# Patient Record
Sex: MALE | Race: WHITE | NOT HISPANIC OR LATINO | Employment: OTHER | URBAN - METROPOLITAN AREA
[De-identification: names, ages, dates, MRNs, and addresses within clinical notes are randomized per-mention and may not be internally consistent; named-entity substitution may affect disease eponyms.]

---

## 2018-08-21 ENCOUNTER — TRANSFERRED RECORDS (OUTPATIENT)
Dept: HEALTH INFORMATION MANAGEMENT | Facility: CLINIC | Age: 78
End: 2018-08-21

## 2018-09-26 ENCOUNTER — TELEPHONE (OUTPATIENT)
Dept: AUDIOLOGY | Facility: CLINIC | Age: 78
End: 2018-09-26

## 2018-09-26 NOTE — TELEPHONE ENCOUNTER
Discussed with Marcelina the type of insurance Reji has. Informed them if the plan is a Medicaid or [place] plan they usually cover hearing aids. I recommended they check with their particular insurance to verify benefits. I informed her that if the plan does cover hearing aids at 100% Reji would need medical clearance for hearing aids. Marcelina reports they have a physician that frequents Reji's residence and they could get clearance from that physician. I informed Marcelina that as a courtesy I would have one of our specialists check on his insurance and I would call with the information that is discovered and then we could make plans from there. Marcelina agrees and will check with insurance and await my return call.     -Kelvin Powell CCC-A

## 2018-09-26 NOTE — TELEPHONE ENCOUNTER
Reason for Call:  Other call back    Detailed comments: Marcelina is POA for Reji. Patient had a hearing test done at [facility] and the facility didn't cover the hearing aids. Patient was advised to come to [place] as we would. Can this be confirmed? They have been looking everywhere for someone that is able to get these hearing aids for them. Also since they already had a hearing test done, would they be able to bring in the hearing test paperwork and just have a consult for the hearing aids? Please advise.     Phone Number Patient can be reached at: Home number on file [phone] (home)    Best Time: any     Can we leave a detailed message on this number? YES    Call taken on 9/26/2018 at 9:05 AM by Dimitris Verde

## 2018-10-25 NOTE — TELEPHONE ENCOUNTER
Patients daughter calling regarding medical nae needed for hearing aids. She is a little confused about what exactly she needs to get. Is it a form or a letter? Please call to clarify. Thank you

## 2018-10-25 NOTE — TELEPHONE ENCOUNTER
I informed Marcelina of the reasons for the medical clearance by a physician. Marcelina informs me that she would bring the medical clearance to Reji's appointment on the 5th of November.     -Kelvin CANOA

## 2018-11-01 ENCOUNTER — MEDICAL CORRESPONDENCE (OUTPATIENT)
Dept: HEALTH INFORMATION MANAGEMENT | Facility: CLINIC | Age: 78
End: 2018-11-01

## 2018-11-05 ENCOUNTER — TELEPHONE (OUTPATIENT)
Dept: AUDIOLOGY | Facility: CLINIC | Age: 78
End: 2018-11-05

## 2018-11-05 NOTE — TELEPHONE ENCOUNTER
Reason for Call:  Other call back    Detailed comments: Patients daughter is calling wanting her dad to be seen asap for him to get hearing aides fitted. He was canceled for today and there are only ent spots open on the schedule for tomorrow and she wants him seen tomorrow if possible. Please call back    Phone Number Patient can be reached at: Home number on file [phone] (home)    Best Time: any    Can we leave a detailed message on this number? YES    Call taken on 11/5/2018 at 9:12 AM by Melissa Choi

## 2018-11-05 NOTE — TELEPHONE ENCOUNTER
Spoke with patient's daughter and informed her that her father cannot be seen tomorrow without provider permission, and the provider is out today. Daughter states that she will need to reschedule for a few weeks out due to her work schedule. Dr. Cronin's next available appointment is on 11/28 in the [facility]. Patient and daughter scheduled for 11/28 at 3:00. They are aware this appointment is in [place] Provided daughter with the clinic address.     Alejandra Osborne, CCC-A  Licensed Audiologist #[id number]  11/5/2018

## 2019-01-02 ENCOUNTER — OFFICE VISIT (OUTPATIENT)
Dept: AUDIOLOGY | Facility: CLINIC | Age: 79
End: 2019-01-02
Payer: COMMERCIAL

## 2019-01-02 DIAGNOSIS — H90.3 SENSORINEURAL HEARING LOSS, BILATERAL: Primary | ICD-10-CM

## 2019-01-02 PROCEDURE — V5275 EAR IMPRESSION: HCPCS | Mod: RT | Performed by: AUDIOLOGIST

## 2019-01-02 PROCEDURE — 99207 ZZC NO CHARGE LOS: CPT | Performed by: AUDIOLOGIST

## 2019-01-02 PROCEDURE — 92591 HC HEARING AID EXAM BINAURAL: CPT | Performed by: AUDIOLOGIST

## 2019-01-02 NOTE — PROGRESS NOTES
AUDIOLOGY REPORT    SUBJECTIVE: Reji Marroquin is a 78 year old male was seen in the Audiology Clinic at  [facility] on 1/02/19 to discuss concerns with hearing and functional communication difficulties. The patient was accompanied by their wife and daughter. Reji has been seen previously on 8/21/2018 at [facility] in [place], and results revealed a mild-moderately severe sensorineural hearing loss bilaterally. Reji notes difficulty with communication in a variety of listening situations.    OBJECTIVE:  Patient is a hearing aid candidate. Patient would like to move forward with a hearing aid evaluation today. Therefore, the patient was presented with different options for amplification to help aid in communication. Discussed styles, levels of technology and monaural vs. binaural fitting.     The hearing aid(s) mutually chosen were:  Binaural: Phonak Audeo M30-R  COLOR: sandalwood  BATTERY SIZE: rechargeable  EARMOLD/TIPS: custom SlimTips  CANAL/ LENGTH: 1    Otoscopy revealed ears are clear of cerumen bilaterally. Bilateral ear impressions were taken without incident.    ASSESSMENT:   Bilateral sensorineural hearing loss     Reviewed purchase information and warranty information with patient. The 45 day trial period was explained to patient. The patient was given a copy of the [place] Department of Health consumer brochure on purchasing hearing instruments. Patient risk factors have been provided to the patient in writing prior to the sale of the hearing aid per FDA regulation. The risk factors are also available in the User Instructional Booklet to be presented on the day of the hearing aid fitting. Hearing aid(s) ordered. Hearing aid evaluation completed. Patient was encouraged to contact their insurance company to investigate any possible hearing aid benefit prior to hearing aid fitting. They are aware that they are ultimately responsible for full payment of  the hearing aid if no insurance coverage exists.       PLAN: Reji is scheduled to return in 2-3 weeks for a hearing aid fitting and programming. Purchase agreement will be completed on that date. Please contact this clinic with any questions or concerns.    Alejandra Osborne, CCC-A  Licensed Audiologist #[id number]  1/2/2019

## 2019-02-04 ENCOUNTER — TELEPHONE (OUTPATIENT)
Dept: AUDIOLOGY | Facility: CLINIC | Age: 79
End: 2019-02-04

## 2019-02-04 NOTE — TELEPHONE ENCOUNTER
Left message for Marcelina informing her of the price of hearing aids ($2760). Also discussed that today's fitting appointment will need to be rescheduled as the hearing aids were backordered by the  and will not arrive in time. Offered appointments on Thursday 2/7 and apologized for the inconvenience. Provided Marcelina will callback number to reschedule.     Alejandra Cooper, [facility]-A  Licensed Audiologist #[id number]  2/4/2019

## 2019-02-15 NOTE — PROGRESS NOTES
AUDIOLOGY REPORT    SUBJECTIVE: Reji Marroquin, a 78 year old male, was seen in the Audiology Clinic at [facility] today for a Binaural hearing aid fitting. Previous results have revealed a bilateral mild-moderately severe sensorineural hearing loss. Patient is accompanied to today's appointment by his wife and daughter.      OBJECTIVE:  Prior to fitting, a hearing aid check was performed to ensure device functionality. The hearing aid conformity evaluation was completed.The hearing aids were placed and they provided a good fit. Real-ear-probe-microphone measurements were completed on the [employer] system and were a good match to NAL-NL2 target with soft sounds audible, moderate sounds comfortable, and loud sounds below discomfort. UCLs are verified through maximum power output measures and demonstrate appropriate limiting of loud inputs. Mr. Marroquin was oriented to proper hearing aid use, care, cleaning (no water, dry brush), batteries (rechargeable, toxicity, low-battery signal), aid insertion/removal, user booklet, warranty information, storage cases, and other hearing aid details. The patient confirmed understanding of hearing aid use and care, and showed proper insertion of hearing aid and batteries while in the office today. Mr. Marroquin reported good volume and sound quality today.    EAR(S) FIT: Binaural    HEARING AID MAKE: Right: Phonak Audeo M30-R; Left: Phonak Audeo M#0-R    HEARING AID STYLE: Right: RAVIN ; Left: RAVIN     LENGTH: Right: 1M   ; Left: 1M     EARMOLDS: Right: acrylic SlimTip  ; Left: acrylic SlimTip     SERIAL NUMBERS: Right: [id number]  ; Left: [id number]    WARRANTY END DATE: Right: 4/30/2021  ; Left: 4/30/2021       CHARGES:   Earmold(s): , Qty 2, $160.00  Hearing Aid Check: Binaural, 43074, $81.00  Dispensing Fee: Binaural, , $500.00  Fit/Orientation: Binaural, , $322.00  Hearing Aid Conformity Evaluation: , Qty:2, $174.00  Hearing Aid Digital: Binaural, BTE,  , $1323.00  Total: $2560.00     ASSESSMENT: Binaural hearing aid fitting completed today. Verification measures were performed. The 45 day trial period was explained to patient, and they expressed understanding. Mr. Marroquin signed the Hearing Aid Purchase Agreement and was given a copy, as well as details on his hearing aids. Patient was counseled that exact out of pocket amounts cannot be determined for hearing aid claims being sent to insurance. Any insurance coverage information presented to the patient is an estimate only, and is not a guarantee of payment. Patient has been advised to check with their own insurance.    PLAN: Mr. Marroquin will return for follow-up in 2-3 weeks for a hearing aid review appointment. Please call this clinic with questions regarding today s appointment.    Alejandra Cooper, [facility]-A  Licensed Audiologist #[id number]  2/15/2019

## 2019-02-15 NOTE — PATIENT INSTRUCTIONS

## 2019-02-18 ENCOUNTER — OFFICE VISIT (OUTPATIENT)
Dept: AUDIOLOGY | Facility: CLINIC | Age: 79
End: 2019-02-18
Payer: MEDICARE

## 2019-02-18 DIAGNOSIS — H90.3 BILATERAL SENSORINEURAL HEARING LOSS: Primary | ICD-10-CM

## 2019-02-18 DIAGNOSIS — H90.3 SENSORINEURAL HEARING LOSS, BILATERAL: Primary | ICD-10-CM

## 2019-02-18 PROCEDURE — V5160 DISPENSING FEE BINAURAL: HCPCS | Performed by: AUDIOLOGIST

## 2019-02-18 PROCEDURE — 92593 HC HEARING AID CHECK, BINAURAL: CPT | Performed by: AUDIOLOGIST

## 2019-02-18 PROCEDURE — V5020 CONFORMITY EVALUATION: HCPCS | Mod: LT | Performed by: AUDIOLOGIST

## 2019-02-18 PROCEDURE — 99207 ZZC NO CHARGE LOS: CPT | Performed by: AUDIOLOGIST

## 2019-02-18 PROCEDURE — V5261 HEARING AID, DIGIT, BIN, BTE: HCPCS | Mod: NU | Performed by: AUDIOLOGIST

## 2019-02-18 PROCEDURE — V5264 EAR MOLD/INSERT: HCPCS | Mod: RT | Performed by: AUDIOLOGIST

## 2019-02-18 PROCEDURE — V5011 HEARING AID FITTING/CHECKING: HCPCS | Mod: LT | Performed by: AUDIOLOGIST

## 2019-02-18 PROCEDURE — V5011 HEARING AID FITTING/CHECKING: HCPCS | Mod: RT | Performed by: AUDIOLOGIST

## 2019-02-18 PROCEDURE — V5267 HEARING AID SUP/ACCESS/DEV: HCPCS | Performed by: AUDIOLOGIST

## 2019-02-18 PROCEDURE — V5020 CONFORMITY EVALUATION: HCPCS | Mod: RT | Performed by: AUDIOLOGIST

## 2019-03-18 ENCOUNTER — OFFICE VISIT (OUTPATIENT)
Dept: AUDIOLOGY | Facility: CLINIC | Age: 79
End: 2019-03-18
Payer: COMMERCIAL

## 2019-03-18 DIAGNOSIS — H90.3 SENSORINEURAL HEARING LOSS, BILATERAL: Primary | ICD-10-CM

## 2019-03-18 PROCEDURE — V5299 HEARING SERVICE: HCPCS | Performed by: AUDIOLOGIST

## 2019-03-18 PROCEDURE — 99207 ZZC NO CHARGE LOS: CPT | Performed by: AUDIOLOGIST

## 2019-03-18 NOTE — PROGRESS NOTES
AUDIOLOGY REPORT    SUBJECTIVE:Reji Marroquin is a 78 year old male who was seen in the Audiology Clinic at the [facility] on 3/18/2019  for a follow-up check regarding the fitting of new hearing aids. Previous results have revealed mild-moderately severe sensorineural hearing loss bilaterally. The patient has been seen previously in this clinic and was fit with Phonak Audeo M50-R RICs on 2/18/2019. Reji reports difficulty placing the hearing aids in the , and difficulty inserting the hearing aids in his ears. He has not been wearing the hearing aids very much for this reason. Patient is accompanied to today's appointment by his daughter.     OBJECTIVE:      Practiced placing hearing aids into . Patient was able to do this correctly with little difficulty (he reported that he had previously been doing it incorrectly). Also practiced insertion of the hearing aids multiple times. Patient was able to insert the right hearing aid with little difficulty. He had more difficulty with the left hearing aid, likely due to rotation of the SlimTip on the end of patient's .     Discussed options with patient and daughter. Will try cShells for increased ease of insertion.        ASSESSMENT: A follow-up appointment for hearing aid fitting was completed today. Changes to hearing aids were completed as outlined above.     PLAN:Reji will return for follow-up as needed, in 2-3 weeks for another appointemnt to check on progress. cShells will be fit at that time. Please call this clinic with any questions regarding today s appointment.    Alejandra Osborne, CCC-A  Licensed Audiologist #[id number]  3/18/2019

## 2019-04-04 ENCOUNTER — TELEPHONE (OUTPATIENT)
Dept: AUDIOLOGY | Facility: CLINIC | Age: 79
End: 2019-04-04

## 2019-04-04 NOTE — TELEPHONE ENCOUNTER
Reason for call:  Other   Patient called regarding (reason for call): appointment  Additional comments: Please call back prior to appointment - family has something they want to discuss.    Phone number to reach patient:  Cell number on file:    Telephone Information:   Mobile [phone]       Best Time:  ASAP    Can we leave a detailed message on this number?  YES

## 2019-04-04 NOTE — TELEPHONE ENCOUNTER
Spoke with patient's daughter who reports that patient has not been wearing the hearing aids as he is frustrated with his difficulty in inserting them. Daughter states that patient is hopeful that the new earpieces will help with this, so he is willing to continue to try, but she is concerned that he may eventually decide to return them if he becomes too frustrated. Patient's daughter was concerned about the 45 day trial period- discussed that we can extend this if need be since patient has not yet been able to wear the hearing aids consistently. Patient's daughter was relieved to hear this. They plan to keep patient's scheduled appointment on 4/8.    Alejandra Cooper, [facility]-A  Licensed Audiologist #[id number]  4/4/2019

## 2019-04-08 ENCOUNTER — OFFICE VISIT (OUTPATIENT)
Dept: AUDIOLOGY | Facility: CLINIC | Age: 79
End: 2019-04-08
Payer: COMMERCIAL

## 2019-04-08 DIAGNOSIS — H90.3 SENSORINEURAL HEARING LOSS, BILATERAL: Primary | ICD-10-CM

## 2019-04-08 PROCEDURE — 99207 ZZC NO CHARGE LOS: CPT | Performed by: AUDIOLOGIST

## 2019-04-08 PROCEDURE — V5299 HEARING SERVICE: HCPCS | Performed by: AUDIOLOGIST

## 2019-04-08 NOTE — PROGRESS NOTES
HEARING AID RECHECK    Patient Name:  Reji Marroquin    Patient Age:   78 year old    :  1940    Background:   Patient seen today for fitting of cShells for his 2019 Phonak Hairdressreo M30-R RAVIN hearing aids. Patient is accompanied to today's appointment by his wife and daughter. Patient has not been wearing his hearing aids over the past several weeks due to frustration with his difficulty inserting the hearing aids.     Procedures:   Attached cShells to patient's hearing aids. cShells were judged to be a good physical fit in patient's ear. Patient practiced inserting the hearing aids with success. He stated that it was easier to put the hearing aids in his ears with the cShells, and he felt more confident in his ability to use them regularly. Also discussed acclimatization process and importance of full-time use.    Patient practiced use of volume control with success. Provided patient with handout regarding volume control use.   Patient retained his old SlimTip molds.      Plan:   Patient to begin full-time use of hearing aids and return in 2 weeks for check on progress. Contact clinic should concerns arise before the next appointment.     NO CHARGE VISIT    Alejandra Vines, CCC-A  Licensed Audiologist  2019

## 2019-04-24 ENCOUNTER — OFFICE VISIT (OUTPATIENT)
Dept: AUDIOLOGY | Facility: CLINIC | Age: 79
End: 2019-04-24
Payer: COMMERCIAL

## 2019-04-24 DIAGNOSIS — H90.3 SENSORINEURAL HEARING LOSS, BILATERAL: Primary | ICD-10-CM

## 2019-04-24 PROCEDURE — V5299 HEARING SERVICE: HCPCS | Performed by: AUDIOLOGIST

## 2019-04-24 NOTE — PROGRESS NOTES
"HEARING AID RECHECK    Patient Name:  Reji Marroquin    Patient Age:   78 year old    :  1940    Background:   Patient returns for recheck of 2019 Phonak SunLinkeo M30-R RICs with custom cShells. Patient is accompanied to today's appointment by his daughter. Patient reports that he has not been wearing the hearing aids because he is hearing a \"screeching\" noise. He states that he has been able to place the hearing aids properly in his ears since he was fit with the cShells.     Procedures:   Patient placed the hearing aids correctly with minimal difficulty in office today. Re-ran feedback calibration to account for cShells.    Following feedback test, patient continued to report \"screeching\", but said it was only occurring when others were talking. Discussed that this is likely the frequency response of the hearing aid amplifying high frequencies more than he is accustomed to. Decreased gain to 80% of targets and activated auto-acclimatization to go to 90% over 30 days. Patient felt that this improved sound quality. Extensive discussion was had regarding acclimatization and importance of full-time use. Patient states that he is willing to try to wear the hearing aids during all waking hours.     Plan:   Patient will wear the hearing aids for 1-2 weeks during all waking hours. He will return as needed should he continue to have problems.    NO CHARGE VISIT    Alejandra Vines, CCC-A  Licensed Audiologist  2019      "

## 2019-05-08 ENCOUNTER — TELEPHONE (OUTPATIENT)
Dept: AUDIOLOGY | Facility: CLINIC | Age: 79
End: 2019-05-08

## 2019-05-08 NOTE — TELEPHONE ENCOUNTER
Reason for Call:  Other call back    Detailed comments: Patient has been using his hearing aids and wants to keep them.     Phone Number Patient can be reached at: Home number on file [phone] (home)    Best Time: any     Can we leave a detailed message on this number? YES    Call taken on 5/8/2019 at 9:35 AM by Dimitris Verde

## 2019-05-15 NOTE — TELEPHONE ENCOUNTER
Patient's daughter, Marcelina, reports that patient's assisted living facility informed her that Reji has part of his hearing aid stuck in his ear. She is unsure which ear it is. Recommended ENT visit for removal. Marcelina stated that she was planning to take patient to Urgent Care. She will start with Urgent Care and schedule ENT appointment if UC cannot remove the piece. Also recommended that hearing aid be dropped off at the [facility] for audiology to evaluate and repair. Marcelina is in agreement with this plan.     Alejandra Cooper, [facility]-A  Licensed Audiologist #[id number]  5/15/2019

## 2019-05-15 NOTE — TELEPHONE ENCOUNTER
Patient has broken the hearing aid off and it's down into his ear and the nurse at that assisted living home cannot get it out. Daughter wondering what she should do. Please advise.

## 2019-05-15 NOTE — TELEPHONE ENCOUNTER
Patient's daughter returned call and would like you to know that the hearing aid is not thru [facility] but actually was bought from [employer]. No need for return call. Thank you.

## 2019-07-30 ENCOUNTER — ALLIED HEALTH/NURSE VISIT (OUTPATIENT)
Dept: AUDIOLOGY | Facility: CLINIC | Age: 79
End: 2019-07-30
Payer: COMMERCIAL

## 2019-07-30 DIAGNOSIS — H90.3 SENSORINEURAL HEARING LOSS, BILATERAL: Primary | ICD-10-CM

## 2019-07-30 PROCEDURE — V5299 HEARING SERVICE: HCPCS | Performed by: AUDIOLOGIST

## 2019-07-30 PROCEDURE — 99207 ZZC NO CHARGE LOS: CPT | Performed by: AUDIOLOGIST

## 2019-07-30 NOTE — PROGRESS NOTES
HEARING AID DROP-OFF    Patient Name:  Reji Marroquin    Patient Age:   79 year old    :  1940    Background:   Patient dropped off their hearing aid with the report of cutting in and out.      SIDE: Both    : Phonak     TYPE: Audeo M30-R RITE    S/N: [id number] & [id number]    WARRANTY: 2021    Procedures:   The earmolds were coated in cerumen. The cerumen was removed and the waxguards were replaced. Biologic listening check finds the hearing aids sounding crisp and clear, the problem could no longer be replicated in the office.     Plan:   Patient was contacted in regards to status of hearing aid/s dropped off today. I spoke with the patient's daughter and informed her that the hearing aids were ready for  at the 2nd floor . I also informed her that if the problem persists and it is not due to cerumen in the waxguard then the hearing aids would have to be sent into the  for repair.     NO CHARGE VISIT    Kelvin Powell CCC-A  Licensed Audiologist  2019

## 2022-10-14 ENCOUNTER — OFFICE VISIT (OUTPATIENT)
Dept: AUDIOLOGY | Facility: CLINIC | Age: 82
End: 2022-10-14
Payer: MEDICARE

## 2022-10-14 DIAGNOSIS — H90.3 SENSORINEURAL HEARING LOSS, BILATERAL: Primary | ICD-10-CM

## 2022-10-14 PROCEDURE — V5299 HEARING SERVICE: HCPCS

## 2022-10-14 NOTE — PROGRESS NOTES
AUDIOLOGY REPORT: HEARING AID RECHECK    SUBJECTIVE: Reji Marroquin is a 82 year old male, :  1940, was seen in the Audiology Clinic at [facility] on 10/14/22 for a return check of their hearing aids. The patient was accompanied by their daughter.      Background:   Patient is here today with the complaint of neither hearing aid working, the earmolds not staying in and would like additional guidance on how to clean the devices.    Procedures:       SIDE: Both                          : Phonak                             TYPE: Audeo M30-R RITE                          S/N: [id number] & [id number]                          WARRANTY: 2021    Hearing aids were connected to software where both were updated to the latest software. Both hearing aids were cleaned and cleaning was discussed. Upon arrival, left hearing aid dead. After charging both, it was found that neither hearing aid worked/weak.     Deep cerumen was noted within the  ports. Changing both of them solved the problem for the left device, but not the right. Patient has cshells with embedded receivers, therefore it will need to go in for repair. The right hearing aid remained dead- it will need to go in for repair. Both patient and his daughter understand the total fees, $100 for left  replacement and $275 for 12 month repair warranty fee for the right.     Discussed taking new ear impressions regarding the complaint of earmolds coming out of his ears. Patient would like to keep the earmolds he has now and will return if problem becomes more bothersome.     Plan:   Right hearing aid and left cshell/ will go in for repair. Once both return, please ensure proper programming, couple cshell to left device (held in office) and call for pick-up. Patient will be billed at the time of pick-up. Patient will return as needed for hearing aid concerns.     NO CHARGE VISIT    Acacia Hu  [facility]-A  Licensed Audiologist  [place] #[id number]  10/14/2022

## 2022-11-03 ENCOUNTER — TELEPHONE (OUTPATIENT)
Dept: AUDIOLOGY | Facility: CLINIC | Age: 82
End: 2022-11-03
Payer: MEDICARE

## 2022-11-03 DIAGNOSIS — H90.3 SENSORINEURAL HEARING LOSS, BILATERAL: Primary | ICD-10-CM

## 2022-11-03 PROCEDURE — V5264 EAR MOLD/INSERT: HCPCS | Mod: RT

## 2022-11-03 PROCEDURE — V5014 HEARING AID REPAIR/MODIFYING: HCPCS

## 2022-11-03 NOTE — TELEPHONE ENCOUNTER
Right hearing aid returned from repair with patient programming restored. Due to deep cerumen within the receivers, the  replaced both earmolds as well.    R: [id number]   L:[id number]    WARRANTY: 1/26/2023    A listening check revealed crisp and clear sound output for both devices.     Patient was called and notified of charges and that hearing aids were ready to be picked up at the first floor  (M-F, 7AM-4PM). Patient's daughter reports insurance will cover these costs.     CHARGES: [id number] (6 month warranty)-$225 &  (earmolds)- $160     Note: previous earmolds were returned from  for patient to keep.     Alejandra Brennan, CCC-A  Licensed Audiologist  [place] #[id number]      11/03/22